# Patient Record
Sex: FEMALE | Race: WHITE
[De-identification: names, ages, dates, MRNs, and addresses within clinical notes are randomized per-mention and may not be internally consistent; named-entity substitution may affect disease eponyms.]

---

## 2018-05-26 ENCOUNTER — HOSPITAL ENCOUNTER (EMERGENCY)
Dept: HOSPITAL 25 - UCEAST | Age: 53
Discharge: HOME | End: 2018-05-26
Payer: COMMERCIAL

## 2018-05-26 VITALS — DIASTOLIC BLOOD PRESSURE: 77 MMHG | SYSTOLIC BLOOD PRESSURE: 134 MMHG

## 2018-05-26 DIAGNOSIS — R21: Primary | ICD-10-CM

## 2018-05-26 PROCEDURE — 99202 OFFICE O/P NEW SF 15 MIN: CPT

## 2018-05-26 PROCEDURE — G0463 HOSPITAL OUTPT CLINIC VISIT: HCPCS

## 2018-05-26 NOTE — UC
Skin Complaint HPI





- HPI Summary


HPI Summary: 





52 yo female with bilat arm rash x 4-5 days


worsening 





some relief with cool soaks











- History of Current Complaint


Chief Complaint: UCSkin


Time Seen by Provider: 05/26/18 08:34


Stated Complaint: RASH


Hx Obtained From: Patient


Onset/Duration: Gradual Onset, Lasting Days


Timing: Constant


Onset Severity: Mild


Current Severity: Mild


Pain Intensity: 0


Pain Scale Used: 0-10 Numeric


Location: Other - arrms only


Character: Pruritus, Redness, Raised


Aggravating Factor(s): Touch


Alleviating Factor(s): Cold


Associated Signs & Symptoms: Positive: Rash





- Allergy/Home Medications


Allergies/Adverse Reactions: 


 Allergies











Allergy/AdvReac Type Severity Reaction Status Date / Time


 


No Known Allergies Allergy   Verified 05/26/18 08:26














Review of Systems


Constitutional: Negative


Skin: Rash


Eyes: Negative


ENT: Negative


Respiratory: Negative


Cardiovascular: Negative


Gastrointestinal: Negative


Genitourinary: Negative


Motor: Negative


Neurovascular: Negative


Musculoskeletal: Negative


Neurological: Negative


Psychological: Negative


Is Patient Immunocompromised?: No


All Other Systems Reviewed And Are Negative: Yes





PMH/Surg Hx/FS Hx/Imm Hx


Previously Healthy: Yes





- Surgical History


Surgical History: Yes


Surgery Procedure, Year, and Place: tosillectomy





- Family History


Known Family History: Positive: Hypertension





- Social History


Alcohol Use: Occasionally


Substance Use Type: None


Smoking Status (MU): Never Smoked Tobacco





- Immunization History


Most Recent Tetanus Shot: 3 years ago





Physical Exam


Triage Information Reviewed: Yes


Appearance: Well-Appearing, No Pain Distress, Well-Nourished


Vital Signs: 


 Initial Vital Signs











Temp  98.2 F   05/26/18 08:21


 


Pulse  76   05/26/18 08:21


 


Resp  16   05/26/18 08:21


 


BP  134/77   05/26/18 08:21


 


Pulse Ox  100   05/26/18 08:21











Eyes: Positive: Conjunctiva Clear


ENT: Positive: Hearing grossly normal, Pharynx normal.  Negative: Pharyngeal 

erythema, Nasal congestion, Nasal drainage, Tonsillar swelling, Trismus, 

Muffled voice


Neck: Positive: Supple


Respiratory: Positive: Lungs clear, Normal breath sounds, No respiratory 

distress, No accessory muscle use


Cardiovascular: Positive: RRR, No Murmur.  Negative: Tachycardia


Musculoskeletal: Positive: ROM Intact, No Edema


Neurological: Positive: Alert


Skin: Positive: rashes - fine papular red rash some in linear arrays





Course/Dx





- Course


Course Of Treatment: pt advised not to apply this to face.  wash hands after 

applying





- Diagnoses


Provider Diagnoses: rash.  suspect contact dermatitis





Discharge





- Sign-Out/Discharge


Documenting (check all that apply): Discharge/Admit/Transfer





- Discharge Plan


Condition: Stable


Disposition: HOME


Prescriptions: 


Triamcinolone 0.5% CREAM(NF) [Triamcinolone 0.5% CREAM*] 1 applic TOPICAL QID #

60 tube


Patient Education Materials:  Acute Rash (ED)


Referrals: 


Jacque Ramires MD [Primary Care Provider] - 6 Days (if not improved)


Additional Instructions: 


rash does not appear to be scabies





? contact dermatitis vs other cause





- Billing Disposition and Condition


Condition: STABLE


Disposition: HOME